# Patient Record
Sex: FEMALE | Race: BLACK OR AFRICAN AMERICAN
[De-identification: names, ages, dates, MRNs, and addresses within clinical notes are randomized per-mention and may not be internally consistent; named-entity substitution may affect disease eponyms.]

---

## 2018-08-01 ENCOUNTER — HOSPITAL ENCOUNTER (EMERGENCY)
Dept: HOSPITAL 72 - EMR | Age: 53
Discharge: HOME | End: 2018-08-01
Payer: COMMERCIAL

## 2018-08-01 VITALS — DIASTOLIC BLOOD PRESSURE: 69 MMHG | SYSTOLIC BLOOD PRESSURE: 119 MMHG

## 2018-08-01 VITALS — BODY MASS INDEX: 30.73 KG/M2 | HEIGHT: 64 IN | WEIGHT: 180 LBS

## 2018-08-01 DIAGNOSIS — R07.0: Primary | ICD-10-CM

## 2018-08-01 DIAGNOSIS — Z88.6: ICD-10-CM

## 2018-08-01 PROCEDURE — 99283 EMERGENCY DEPT VISIT LOW MDM: CPT

## 2018-08-01 NOTE — EMERGENCY ROOM REPORT
History of Present Illness


General


Chief Complaint:  Sore Throat


Source:  Patient





Present Illness


HPI


53-year-old female patient presents ER complaining of sore throat 

intermittently for the past month.  Reports that it hurts to swallow.  Reports 

no cough or vomiting.  Reports that she was previously seen by her doctor 

prescribed antibiotics which briefly relieved her symptoms have not completely 

get rid of them.  Denies other acute symptoms.  Denies chest pain, shortness 

breath, other acute symptoms. Denies cough, fever.


Allergies:  


Coded Allergies:  


     MORPHINE (Verified  Allergy, Unknown, 8/1/18)





Patient History


Past Medical History:  see triage record


Last Menstrual Period:  NA


Pregnant Now:  No


Reviewed Nursing Documentation:  PMH: Agreed; PSxH: Agreed





Nursing Documentation-PMH


Past Medical History:  No History, Except For





Review of Systems


All Other Systems:  negative except mentioned in HPI





Physical Exam





Vital Signs








  Date Time  Temp Pulse Resp B/P (MAP) Pulse Ox O2 Delivery O2 Flow Rate FiO2


 


8/1/18 13:08 97.9 67 20 119/69 99 Room Air  





 97.9       








Sp02 EP Interpretation:  reviewed, normal


General Appearance:  well appearing, no apparent distress, alert, GCS 15, non-

toxic


Head:  normocephalic, atraumatic


Eyes:  bilateral eye normal inspection, bilateral eye PERRL


ENT:  hearing grossly normal, normal pharynx, no angioedema, normal voice, TMs 

+ canals normal, uvula midline, moist mucus membranes, other - uvula midline, 

no swelling, erythema or exudates


Neck:  full range of motion, thyroid normal


Respiratory:  lungs clear, normal breath sounds, no rhonchi, no respiratory 

distress, no accessory muscle use, no wheezing, speaking full sentences


Cardiovascular #1:  regular rate, rhythm, no edema


Musculoskeletal:  back normal, digits/nails normal, gait/station normal, normal 

range of motion, non-tender


Neurologic:  alert, oriented x3, responsive, motor strength/tone normal, 

sensory intact


Psychiatric:  mood/affect normal


Skin:  no rash


Lymphatic:  no adenopathy





Medical Decision Making


PA Attestation


Dr. Espinal  is my supervising Physician whom patient management has been 

discussed with.


Diagnostic Impression:  


 Primary Impression:  


 Sore throat


ER Course


Pt presents to ED c/o sore throat.





DDX considered but are not limited to pharyngitis, laryngitis, URI, 

peritonsillar abscess, tonsillitis.


Low suspicion for peritonsillar abscess, no neck stiffness, no hot potato voice

, no stridor. 





Does not require imaging at this time.





VITAL SIGNS are WNL, patient is afebrile.





ER COURSE:


erythema, no tonsillar exudates, no fever, likely viral etiology of symptoms.


provide patient with Viscous Lidocaine


salt water gargles and follow-up with primary care provider for further 

treatment and referral as needed.


Patient reports feeling better following administration of medication





DISCHARGE: 


Rx provided for Tylenol for pain and fever symptoms





At this time pt is stable for d/c to home. Patient is resting comfortably, in 

no acute distress, nontoxic appearing, talking without difficulty.


Will provide with patient care instructions and any necessary prescriptions.


Patient to take medication as instructed.


Care plan and follow-up instructions provided.


Patient questions asked and answered.


Patient instructed to follow-up with primary care provider in 3 - 5 days.


ER precautions given. Patient instructed to return to ER immediately for any 

new or worsening of symptoms including but not limited to intractable vomiting, 

difficulty breathing, inability to eat.





- Please note that this Emergency Department Report was dictated using [employer] technology software, occasionally this can lead to 

erroneous entry secondary to interpretation by the dictation equipment.





Last Vital Signs








  Date Time  Temp Pulse Resp B/P (MAP) Pulse Ox O2 Delivery O2 Flow Rate FiO2


 


8/1/18 13:16 97.9  20 119/69 99 Room Air  





 97.9       


 


8/1/18 13:08  67      








Disposition:  HOME, SELF-CARE


Condition:  Stable


Scripts


Acetaminophen* (TYLENOL EXTRA STRENGTH*) 500 Mg Tablet


500 MG ORAL Q8H PRN for Prn Headache/Temp > 101, #30 TAB 0 Refills


   Prov: Crescencio Adnerson         8/1/18


Patient Instructions:  Sore Throat





Additional Instructions:  


Followup with primary care provider in 3 -5 days.


Salt water gargles


Rx provided for Tylenol for pain and fever symptoms


Drink plenty of water.


Take medications as directed. 


Patient questions asked and answered.


ER precautions given, patient instructed to return to ER immediately for any 

new or worsening of symptoms including but not limited to intractable vomiting, 

difficulty breathing, inability to eat.











Crescencio Anderson Aug 1, 2018 13:34